# Patient Record
Sex: FEMALE | Race: WHITE | Employment: FULL TIME | URBAN - METROPOLITAN AREA
[De-identification: names, ages, dates, MRNs, and addresses within clinical notes are randomized per-mention and may not be internally consistent; named-entity substitution may affect disease eponyms.]

---

## 2022-03-18 PROBLEM — Z71.3 DIETARY COUNSELING: Status: ACTIVE | Noted: 2017-11-19

## 2022-03-18 PROBLEM — K21.9 GASTROESOPHAGEAL REFLUX DISEASE WITHOUT ESOPHAGITIS: Status: ACTIVE | Noted: 2019-02-07

## 2022-03-18 PROBLEM — Z71.2 ENCOUNTER TO DISCUSS TEST RESULTS: Status: ACTIVE | Noted: 2018-01-03

## 2022-03-18 PROBLEM — S93.601A SPRAIN OF RIGHT FOOT: Status: ACTIVE | Noted: 2019-02-07

## 2022-03-18 PROBLEM — R26.89 ANTALGIC GAIT: Status: ACTIVE | Noted: 2019-02-07

## 2022-03-18 PROBLEM — Z12.39 SCREENING BREAST EXAMINATION: Status: ACTIVE | Noted: 2017-11-19

## 2022-03-19 PROBLEM — F90.9 ATTENTION DEFICIT HYPERACTIVITY DISORDER (ADHD): Status: ACTIVE | Noted: 2017-11-19

## 2022-03-19 PROBLEM — L03.90 CELLULITIS: Status: ACTIVE | Noted: 2018-05-11

## 2022-03-19 PROBLEM — Z00.00 PHYSICAL EXAM: Status: ACTIVE | Noted: 2017-11-19

## 2022-03-19 PROBLEM — Z00.8 ENCOUNTER FOR WORK CAPABILITY ASSESSMENT: Status: ACTIVE | Noted: 2019-02-07

## 2022-03-19 PROBLEM — S93.401A SPRAIN OF RIGHT ANKLE: Status: ACTIVE | Noted: 2019-02-07

## 2022-03-19 PROBLEM — M87.00 AVASCULAR BONE NECROSIS (HCC): Status: ACTIVE | Noted: 2019-02-11

## 2022-03-19 PROBLEM — R30.0 DYSURIA: Status: ACTIVE | Noted: 2017-11-09

## 2022-03-19 PROBLEM — R01.1 CARDIAC MURMUR: Status: ACTIVE | Noted: 2017-11-19

## 2022-03-19 PROBLEM — N39.0 URINARY TRACT INFECTION WITHOUT HEMATURIA: Status: ACTIVE | Noted: 2017-11-09

## 2022-03-19 PROBLEM — N76.0 VULVOVAGINITIS: Status: ACTIVE | Noted: 2017-11-09

## 2022-03-19 PROBLEM — F33.9 RECURRENT MAJOR DEPRESSIVE DISORDER (HCC): Status: ACTIVE | Noted: 2017-11-19

## 2022-03-19 PROBLEM — Z13.228 SCREENING FOR METABOLIC DISORDER: Status: ACTIVE | Noted: 2017-11-19

## 2022-03-19 PROBLEM — L72.3 SEBACEOUS CYST: Status: ACTIVE | Noted: 2018-05-11

## 2022-03-19 PROBLEM — E66.01 SEVERE OBESITY (HCC): Status: ACTIVE | Noted: 2019-02-07

## 2022-03-19 PROBLEM — L72.9 INFECTED CYST OF SKIN: Status: ACTIVE | Noted: 2018-05-11

## 2022-03-19 PROBLEM — Z30.9 ENCOUNTER FOR CONTRACEPTIVE MANAGEMENT: Status: ACTIVE | Noted: 2018-01-03

## 2022-03-19 PROBLEM — E66.9 OBESITY: Status: ACTIVE | Noted: 2017-11-19

## 2022-03-19 PROBLEM — L08.9 INFECTED CYST OF SKIN: Status: ACTIVE | Noted: 2018-05-11

## 2022-03-19 PROBLEM — J01.00 ACUTE MAXILLARY SINUSITIS: Status: ACTIVE | Noted: 2017-07-06

## 2022-03-20 PROBLEM — R79.89 ELEVATED SERUM FREE T4 LEVEL: Status: ACTIVE | Noted: 2018-01-03

## 2023-10-02 ENCOUNTER — OFFICE VISIT (OUTPATIENT)
Dept: ORTHOPEDIC SURGERY | Age: 23
End: 2023-10-02
Payer: COMMERCIAL

## 2023-10-02 VITALS — BODY MASS INDEX: 36.16 KG/M2 | HEIGHT: 66 IN | WEIGHT: 225 LBS

## 2023-10-02 DIAGNOSIS — M54.50 LOW BACK PAIN, UNSPECIFIED BACK PAIN LATERALITY, UNSPECIFIED CHRONICITY, UNSPECIFIED WHETHER SCIATICA PRESENT: Primary | ICD-10-CM

## 2023-10-02 DIAGNOSIS — M51.26 DISPLACEMENT OF LUMBAR INTERVERTEBRAL DISC WITHOUT MYELOPATHY: ICD-10-CM

## 2023-10-02 PROCEDURE — 99204 OFFICE O/P NEW MOD 45 MIN: CPT | Performed by: ORTHOPAEDIC SURGERY

## 2023-10-02 RX ORDER — MELOXICAM 7.5 MG/1
7.5 TABLET ORAL 2 TIMES DAILY PRN
Qty: 60 TABLET | Refills: 4 | Status: SHIPPED | OUTPATIENT
Start: 2023-10-02

## 2023-10-02 RX ORDER — GABAPENTIN 300 MG/1
300 CAPSULE ORAL 3 TIMES DAILY
Qty: 90 CAPSULE | Refills: 4 | Status: SHIPPED | OUTPATIENT
Start: 2023-10-02 | End: 2024-03-30

## 2023-10-02 NOTE — PROGRESS NOTES
[address] [facility]  Consultation Note    Patient ID:  Name: Pam Manuel  MRN: [id number]  AGE: 24 y. o.  : 2000    Date of Consultation:  2023    CC:   Chief Complaint   Patient presents with    New Patient    Lower Back Pain         HPI: This is a new patient visit on Pam Manuel, she is a 56-year-old white female who is complaining of low back and right leg pain since a work comp injury that occurred on 2023. She lifted a heavy object and twisted and had a cute onset of pain but she says the pain has gotten some better over time. The right leg pain tends to radiate towards the right groin area and then it goes in the anterior thigh to down to the knee where she gets numbness and tingling as well but she does get some numbness that extends down into the shin area. She occasionally has some left anterior thigh numbness and tingling but no pain. She denies weakness or bowel bladder incontinence. She has pain most of the time it seems to be exacerbated by sitting and bending forward and walking and seems to be better using ice. Treatment has consisted of ibuprofen and Tylenol and she might of had some steroid pills as well. Past Medical History Includes: She is not a diabetic and denies heart or lung disease and she is not on any blood thinners      Family History: History reviewed. No pertinent family history. Social History:   Social History     Tobacco Use    Smoking status: Unknown    Smokeless tobacco: Not on file   Substance Use Topics    Alcohol use: Not on file       ALLERGIES: No Known Allergies     Patient Medications    No current outpatient medications on file. No current facility-administered medications for this visit. Review of Systems:  A comprehensive review of systems was negative except for that written in the HPI. Physical Exam: She is 5 6 and weighs 225 pounds. She is normal in appearance but looks uncomfortable.   She has a

## 2023-10-13 ENCOUNTER — TELEPHONE (OUTPATIENT)
Dept: ORTHOPEDIC SURGERY | Age: 23
End: 2023-10-13

## 2023-10-13 NOTE — TELEPHONE ENCOUNTER
She has had a trip planned since April. She is not going to be able to go because of her back injury. She is asking for a note stating she cannot fly Oct 20 - Oct 28 so maybe she can get a refund. Please let her know.

## 2023-10-30 ENCOUNTER — OFFICE VISIT (OUTPATIENT)
Dept: ORTHOPEDIC SURGERY | Age: 23
End: 2023-10-30
Payer: COMMERCIAL

## 2023-10-30 DIAGNOSIS — M54.17 LUMBOSACRAL RADICULOPATHY: Primary | ICD-10-CM

## 2023-10-30 PROCEDURE — 64483 NJX AA&/STRD TFRM EPI L/S 1: CPT | Performed by: PHYSICAL MEDICINE & REHABILITATION

## 2023-10-30 RX ORDER — TRIAMCINOLONE ACETONIDE 40 MG/ML
80 INJECTION, SUSPENSION INTRA-ARTICULAR; INTRAMUSCULAR ONCE
Status: COMPLETED | OUTPATIENT
Start: 2023-10-30 | End: 2023-10-30

## 2023-10-30 RX ADMIN — TRIAMCINOLONE ACETONIDE 80 MG: 40 INJECTION, SUSPENSION INTRA-ARTICULAR; INTRAMUSCULAR at 13:49

## 2023-10-30 NOTE — PROGRESS NOTES
Date: 10/30/23   Name: Cheri Persaud    Pre-Procedural Diagnosis:    Diagnosis Orders   1. Lumbosacral radiculopathy  FL NERVE BLOCK LUMBOSACRAL 1ST    triamcinolone acetonide (KENALOG-40) injection 80 mg          Procedure: Selective Nerve Root Blocks (Transforaminal) - Single Level    Precautions: [facility] Precautions spine injections: None. Patient denies any prior sensitivity to steroid, local anesthetic, contrast dye, iodine or shellfish. The procedure was discussed at length with the patient and informed consent was signed. The patient was placed in a prone position on the fluoroscopy table and the skin was prepped and draped in a routine sterile fashion. The areas to be injected was/were anesthetized with approximately 5 cc of 1% Lidocaine. A 22-gauge 3.5 inch spinal needle was carefully advanced under fluoroscopic guidance to the right L3 transforaminal space(s). At this time 0.25 cc of omnipaque administered. Once proper placement was confirmed, 2 cc of 0.25% Marcaine and 80 mg of Kenalog were injected through the spinal needle at that/each site. Fluoroscopic guidance was used intermittently over a 10-minute period to insure proper needle placement and patient safety. A hard copy of the fluoroscopic  images has been placed in the patient's chart. The patient was monitored after the procedure and discharged home in stable fashion. A total of 2 cc of Kenalog were administered during this procedure.     Resume Meds:  N/A        Jair Day MD  10/30/23

## 2023-12-05 ENCOUNTER — OFFICE VISIT (OUTPATIENT)
Dept: ORTHOPEDIC SURGERY | Age: 23
End: 2023-12-05
Payer: COMMERCIAL

## 2023-12-05 DIAGNOSIS — S39.012A STRAIN OF LUMBAR REGION, INITIAL ENCOUNTER: Primary | ICD-10-CM

## 2023-12-05 PROCEDURE — 99213 OFFICE O/P EST LOW 20 MIN: CPT | Performed by: ORTHOPAEDIC SURGERY

## 2023-12-05 NOTE — PROGRESS NOTES
against moderate resistance; 5= active movement against full resistance     Straight leg testing is negative    Radiographic Studies:     X-rays including AP and lateral views of the lumbar spine were reviewed and interpreted: There is noted to be normal overall alignment, development and bony structures. There is no fracture or destructive lesion. No evidence for instability. The disk spaces are well-preserved. Bone quality appears normal.                  Radiographic Impression:    Normal lumbar spine    MRI of the lumbar spine images were reviewed and interpreted: MRI lumbar spine shows appropriate alignment. There is a small right-sided disc protrusion at L2-3    Diagnosis:      ICD-10-CM    1. Strain of lumbar region, initial encounter  S39.012A Amb External Referral To Physical Therapy          Assessment/Plan:      -I discussed with the patient the diagnosis as well as the imaging findings. At this time I recommend physical therapy as the patient has not done physical therapy. We will have her return to work on 12/11/2023 to light duty. No bending, lifting, twisting with anything greater than 15 pounds. She will continue to do light duty for 4 weeks until she has completed physical therapy. She will follow-up after the time and will likely be released to full duty without restriction. .  Patient was in agreement with the plan. -Follow-up 4 weeks    Yamel Roque.  Desiree Hylton MD  Orthopaedic Spine Surgery     12/05/23  5:05 PM

## 2024-01-26 ENCOUNTER — TELEPHONE (OUTPATIENT)
Dept: ORTHOPEDIC SURGERY | Age: 24
End: 2024-01-26

## 2024-01-26 NOTE — TELEPHONE ENCOUNTER
I called patient to offer an earlier appointment time on 1/30/24 with Dr. Be. No answer LM to call the office if she would like to come in earlier.

## 2024-01-30 ENCOUNTER — OFFICE VISIT (OUTPATIENT)
Dept: ORTHOPEDIC SURGERY | Age: 24
End: 2024-01-30
Payer: COMMERCIAL

## 2024-01-30 DIAGNOSIS — M54.50 LOW BACK PAIN, UNSPECIFIED BACK PAIN LATERALITY, UNSPECIFIED CHRONICITY, UNSPECIFIED WHETHER SCIATICA PRESENT: Primary | ICD-10-CM

## 2024-01-30 DIAGNOSIS — M79.604 PAIN OF RIGHT LOWER EXTREMITY: ICD-10-CM

## 2024-01-30 PROCEDURE — 99213 OFFICE O/P EST LOW 20 MIN: CPT | Performed by: ORTHOPAEDIC SURGERY

## 2024-01-30 NOTE — PROGRESS NOTES
Name: Kelin Bolton  YOB: 2000  Gender: female  MRN: [id number]  Age: 23 y.o.      Chief Complaint: Worker's Compensation recheck.    History of Present Illness:      This is a very pleasant 23 y.o. female who presents with a history of a work injury that occurred on 8/1/2023 after she lifted a heavy object and had acute onset of back pain.  She also states that she began having neck pain after this incident.  Since that time she has done physical therapy as well as had a selective nerve root block which only provided 40% relief at L3.  She states that since her injury her symptoms have worsened.  She states that she has back pain that radiates down both legs with the right leg being affected more than the left.  She states that the pain radiates down her right leg laterally.  She occasionally does get some pain in her thigh that goes from lateral to medial.  She states that her foot is currently numb.  In regards to her neck she states that she has occipital headaches that radiate up the back of her head to the front.  She also states that she will get pain in her anterior chest along her clavicle bilaterally.  She also gets pain between her shoulder blades.  She also will get pain in her arms.  She states that her right arm has went limp before.  She also states that she has noticed that her hands have been shaking at rest a lot more.    She denies consuming more than a cup of coffee a day.  She does not take any other stimulants.    Medications:       Current Outpatient Medications:     meloxicam (MOBIC) 7.5 MG tablet, Take 1 tablet by mouth 2 times daily as needed for Pain, Disp: 60 tablet, Rfl: 4    gabapentin (NEURONTIN) 300 MG capsule, Take 1 capsule by mouth 3 times daily for 180 days. Intended supply: 30 days, Disp: 90 capsule, Rfl: 4    Allergies:    No Known Allergies      Physical Exam:     This is a well developed well nourished female adult in no acute distress.     Mood and affect are

## 2024-02-01 ENCOUNTER — TELEPHONE (OUTPATIENT)
Dept: ORTHOPEDIC SURGERY | Age: 24
End: 2024-02-01

## 2024-02-08 ENCOUNTER — TELEPHONE (OUTPATIENT)
Dept: ORTHOPEDIC SURGERY | Age: 24
End: 2024-02-08

## 2024-02-08 DIAGNOSIS — M51.26 DISPLACEMENT OF LUMBAR INTERVERTEBRAL DISC WITHOUT MYELOPATHY: ICD-10-CM

## 2024-02-08 DIAGNOSIS — S39.012A STRAIN OF LUMBAR REGION, INITIAL ENCOUNTER: Primary | ICD-10-CM

## 2024-02-08 DIAGNOSIS — M54.50 LOW BACK PAIN, UNSPECIFIED BACK PAIN LATERALITY, UNSPECIFIED CHRONICITY, UNSPECIFIED WHETHER SCIATICA PRESENT: ICD-10-CM

## 2024-02-08 NOTE — TELEPHONE ENCOUNTER
Referral faxed to [facility] - referral was faxed to Dr. Ardon the office isn't accepting WC at this time.     Per Dr. Be we will order PT and fax order to [facility] per patients request.

## 2024-02-08 NOTE — TELEPHONE ENCOUNTER
She is asking that a new PT order be faxed to [facility] @ [address] [place] [address] in [place]. This is a WC case.

## 2024-02-08 NOTE — TELEPHONE ENCOUNTER
I called spoke to patient she is aware PT order and pain management referrals have been faxed the office will call her for an appointment once approved by .

## 2024-02-15 ENCOUNTER — TELEPHONE (OUTPATIENT)
Dept: ORTHOPEDIC SURGERY | Age: 24
End: 2024-02-15

## 2024-02-15 NOTE — TELEPHONE ENCOUNTER
I discussed work restrictions with Dr. Be; per Dr. Be patient can remain on same restrictions until she sees pain management and they will take over her care.      I called spoke to patient; she is aware of the above and verbalize understanding. Work note is in chart and Saima Dawson ( coordinator) will fax work note to .

## 2024-02-15 NOTE — TELEPHONE ENCOUNTER
She wants to know if her work restrictions have been updated yet. She is back at work and is doing okay with the work restrictions. She says it will be a struggle without the restrictions. Her employer is asking for an updated note. She wants to know when she is supposed to follow up with Dr. Be. Can you call to discuss restrictions with her.

## 2024-04-11 ENCOUNTER — CLINICAL DOCUMENTATION (OUTPATIENT)
Dept: ORTHOPEDIC SURGERY | Age: 24
End: 2024-04-11

## 2024-04-11 NOTE — PROGRESS NOTES
Fee letter for 14 B sent to  [employer] Wad[employer] via fax at [phone]. After receiving payment form will be faxed to .      [facility]#[id number]  Phone:[phone]  Fax:[phone]

## 2024-05-06 ENCOUNTER — CLINICAL DOCUMENTATION (OUTPATIENT)
Age: 24
End: 2024-05-06

## 2024-05-06 NOTE — PROGRESS NOTES
Faxed medical questionnaire invoice to [phone]. Form will be faxed once payment is received; Payment hasn't been received for 14B form; invoice was faxed again today.

## 2024-05-09 ENCOUNTER — CLINICAL DOCUMENTATION (OUTPATIENT)
Age: 24
End: 2024-05-09

## 2024-05-10 ENCOUNTER — CLINICAL DOCUMENTATION (OUTPATIENT)
Age: 24
End: 2024-05-10

## 2024-05-10 NOTE — PROGRESS NOTES
The 14 B form has been filled out and sending to ES to Mary Jackson to scan in chart and send to  office.

## 2024-05-17 ENCOUNTER — TELEPHONE (OUTPATIENT)
Dept: ORTHOPEDIC SURGERY | Age: 24
End: 2024-05-17

## 2024-05-17 NOTE — TELEPHONE ENCOUNTER
I returned Ish call she states the hearing is scheduled for 5/30/24 and would like to schedule a date and it doesn't have to be before the hearing; they are filing a motion and need to have a date for the deposition. I discussed depositions fees with her and I will fax fee invoice ton [phone] to her attention. She is hand delivering the check to [facility] on Monday. She is aware a date will be given once we receive payment. She verbalized understanding.

## 2024-05-17 NOTE — TELEPHONE ENCOUNTER
Please call Flor [facility] and [employer], [phone],  needs to set up a time for a deposition for Dr Be on this patient .

## 2024-06-06 ENCOUNTER — TELEPHONE (OUTPATIENT)
Age: 24
End: 2024-06-06

## 2024-06-06 NOTE — TELEPHONE ENCOUNTER
I called Flor at [employer] to confirm deposition date for 6/19/24 at 3 pm with Dr. Be at . No answer lm to call office to confirm date and time.

## 2024-06-14 ENCOUNTER — TELEPHONE (OUTPATIENT)
Age: 24
End: 2024-06-14

## 2024-06-14 NOTE — TELEPHONE ENCOUNTER
----- Message from Sis Ponce sent at 6/14/2024  9:27 AM EDT -----  Please call  elvisk at law [employer] about a depo/they need to cx /[phone]  Need to be cx

## 2024-06-14 NOTE — TELEPHONE ENCOUNTER
I returned Munoz call states case has settled and they are cancelling the deposition for 6/19/24 with TEDDY; she is aware check will not be returned. She verbalized understanding.